# Patient Record
Sex: FEMALE | URBAN - NONMETROPOLITAN AREA
[De-identification: names, ages, dates, MRNs, and addresses within clinical notes are randomized per-mention and may not be internally consistent; named-entity substitution may affect disease eponyms.]

---

## 2021-06-29 ENCOUNTER — APPOINTMENT (RX ONLY)
Dept: URBAN - NONMETROPOLITAN AREA CLINIC 13 | Facility: CLINIC | Age: 41
Setting detail: DERMATOLOGY
End: 2021-06-29

## 2021-06-29 DIAGNOSIS — Z41.9 ENCOUNTER FOR PROCEDURE FOR PURPOSES OTHER THAN REMEDYING HEALTH STATE, UNSPECIFIED: ICD-10-CM

## 2021-06-29 PROCEDURE — ? BOTOX

## 2021-06-29 NOTE — PROCEDURE: BOTOX
Show Additional Area 5: Yes
L Brow Units: 0
Detail Level: Zone
Expiration Date (Month Year): 10/23
Show Mentalis Units: No
Additional Area 3 Location: Right lateral eye
Dilution (U/0.1 Cc): 1
Additional Area 6 Location: R side of forehead
Post-Care Instructions: Patient instructed to not lie down for 4 hours and limit physical activity for 24 hours. Patient instructed not to travel by airplane for 48 hours.
Inferior Lateral Orbicularis Oculi Units: 15
Additional Area 5 Location: forhead R side 1 unit.
Price (Use Numbers Only, No Special Characters Or $): 540
Forehead Units: 5
Additional Area 2 Location: upper lip
Additional Area 1 Location: brow 2u R 4u L
Additional Area 4 Location: under eyes
Consent: Written consent obtained. Risks include but not limited to lid/brow ptosis, bruising, swelling, diplopia, temporary effect, incomplete chemical denervation.
Lot #: 
Glabellar Complex Units: 20

## 2021-08-12 ENCOUNTER — APPOINTMENT (RX ONLY)
Dept: URBAN - NONMETROPOLITAN AREA CLINIC 13 | Facility: CLINIC | Age: 41
Setting detail: DERMATOLOGY
End: 2021-08-12

## 2021-08-12 DIAGNOSIS — Z41.9 ENCOUNTER FOR PROCEDURE FOR PURPOSES OTHER THAN REMEDYING HEALTH STATE, UNSPECIFIED: ICD-10-CM

## 2021-08-12 PROCEDURE — ? BOTOX

## 2021-11-16 ENCOUNTER — APPOINTMENT (RX ONLY)
Dept: URBAN - NONMETROPOLITAN AREA CLINIC 13 | Facility: CLINIC | Age: 41
Setting detail: DERMATOLOGY
End: 2021-11-16

## 2021-11-16 DIAGNOSIS — Z41.9 ENCOUNTER FOR PROCEDURE FOR PURPOSES OTHER THAN REMEDYING HEALTH STATE, UNSPECIFIED: ICD-10-CM

## 2021-11-16 PROCEDURE — ? BOTOX

## 2021-11-16 NOTE — PROCEDURE: BOTOX
Additional Area 1 Units: 0
Show Masseter Units: Yes
Show Ucl Units: No
Detail Level: Zone
Additional Area 3 Location: left lateral eye 3 Right 4
Glabellar Complex Units: 20
Lot #: 
Additional Area 2 Location: upper lip
Consent: Written consent obtained. Risks include but not limited to lid/brow ptosis, bruising, swelling, diplopia, temporary effect, incomplete chemical denervation.
Inferior Lateral Orbicularis Oculi Units: 15
Additional Area 5 Location: forhead R side 1 unit.
Post-Care Instructions: Patient instructed to not lie down for 4 hours and limit physical activity for 24 hours. Patient instructed not to travel by airplane for 48 hours.
Forehead Units: 5
Additional Area 4 Location: corners of nose for gummy smile
Expiration Date (Month Year): 2/24
Price (Use Numbers Only, No Special Characters Or $): 714
Dilution (U/0.1 Cc): 1
Additional Area 6 Location: chin
Additional Area 2 Units: 4
Additional Area 1 Location: brow,

## 2022-02-10 ENCOUNTER — APPOINTMENT (RX ONLY)
Dept: URBAN - NONMETROPOLITAN AREA CLINIC 13 | Facility: CLINIC | Age: 42
Setting detail: DERMATOLOGY
End: 2022-02-10

## 2022-02-10 DIAGNOSIS — Z41.9 ENCOUNTER FOR PROCEDURE FOR PURPOSES OTHER THAN REMEDYING HEALTH STATE, UNSPECIFIED: ICD-10-CM

## 2022-02-10 PROCEDURE — ? BOTOX

## 2022-02-10 NOTE — PROCEDURE: BOTOX
Depressor Anguli Oris Units: 0
Show Levator Superior Units: Yes
Additional Area 5 Location: forhead R side 1 unit.
Price (Use Numbers Only, No Special Characters Or $): 194
Post-Care Instructions: Patient instructed to not lie down for 4 hours and limit physical activity for 24 hours. Patient instructed not to travel by airplane for 48 hours.
Additional Area 2 Location: upper lip
Expiration Date (Month Year): 4/24
Show Mentalis Units: No
Forehead Units: 5
Detail Level: Zone
Dilution (U/0.1 Cc): 1
Consent: Written consent obtained. Risks include but not limited to lid/brow ptosis, bruising, swelling, diplopia, temporary effect, incomplete chemical denervation.
Additional Area 1 Location: brow
Additional Area 4 Location: corners of nose for gummy smile
Glabellar Complex Units: 20
Additional Area 2 Units: 4
Additional Area 6 Location: chin
Lot #: [id number]
Inferior Lateral Orbicularis Oculi Units: 15
Additional Area 3 Location: left lateral eye 3 Right 4

## 2022-06-17 ENCOUNTER — APPOINTMENT (RX ONLY)
Dept: URBAN - NONMETROPOLITAN AREA CLINIC 13 | Facility: CLINIC | Age: 42
Setting detail: DERMATOLOGY
End: 2022-06-17

## 2022-06-17 DIAGNOSIS — Z41.9 ENCOUNTER FOR PROCEDURE FOR PURPOSES OTHER THAN REMEDYING HEALTH STATE, UNSPECIFIED: ICD-10-CM

## 2022-06-17 PROCEDURE — ? BOTOX

## 2022-06-17 NOTE — PROCEDURE: BOTOX
Additional Area 6 Units: 0
Glabellar Complex Units: 20
Show Anterior Platysmal Band Units: Yes
Price (Use Numbers Only, No Special Characters Or $): 884
Additional Area 3 Location: [facility] [place]
Additional Area 1 Location: raymundo R 3u, L 2u
Show Right And Left Periorbital Units: No
Inferior Lateral Orbicularis Oculi Units: 15
Lot #: 
Detail Level: Zone
Additional Area 5 Location: corner of nose/upper lip area
Dilution (U/0.1 Cc): 1
Additional Area 2 Location: upper lip
Consent: Written consent obtained. Risks include but not limited to lid/brow ptosis, bruising, swelling, diplopia, temporary effect, incomplete chemical denervation.
Additional Area 2 Units: 4
Expiration Date (Month Year): 7/24
Post-Care Instructions: Patient instructed to not lie down for 4 hours and limit physical activity for 24 hours. Patient instructed not to travel by airplane for 48 hours.
Additional Area 6 Location: chin
Forehead Units: 5
Additional Area 4 Location: under lower lash line R side